# Patient Record
Sex: MALE | Race: ASIAN
[De-identification: names, ages, dates, MRNs, and addresses within clinical notes are randomized per-mention and may not be internally consistent; named-entity substitution may affect disease eponyms.]

---

## 2019-01-04 ENCOUNTER — HOSPITAL ENCOUNTER (EMERGENCY)
Dept: HOSPITAL 76 - ED | Age: 29
Discharge: HOME | End: 2019-01-04
Payer: COMMERCIAL

## 2019-01-04 VITALS — DIASTOLIC BLOOD PRESSURE: 80 MMHG | SYSTOLIC BLOOD PRESSURE: 120 MMHG

## 2019-01-04 DIAGNOSIS — B34.9: Primary | ICD-10-CM

## 2019-01-04 PROCEDURE — 87275 INFLUENZA B AG IF: CPT

## 2019-01-04 PROCEDURE — 87276 INFLUENZA A AG IF: CPT

## 2019-01-04 PROCEDURE — 99283 EMERGENCY DEPT VISIT LOW MDM: CPT

## 2019-01-04 NOTE — ED PHYSICIAN DOCUMENTATION
History of Present Illness





- Stated complaint


Stated Complaint: FEVER/HEADACHE





- Chief complaint


Chief Complaint: Neuro





- History obtained from


History obtained from: Patient





- History of Present Illness


Timing: Today





- Additonal information


Additional information: 





28-year-old male in his usual state of health awoke this morning with a headache

and developed fever and chills associated with this later in the day.  He 

subsequently has developed some nausea he has not developed a cough.  He does 

have generalized aches and pains as well.  The last time he felt like this it 

was the flu.  He has been around a number of families that have been ill with 

similar symptoms.





Review of Systems


Constitutional: reports: Fever, Chills, Myalgias, Fatigue


Eyes: denies: Decreased vision


Ears: denies: Ear pain


Nose: denies: Rhinorrhea / runny nose, Congestion


Throat: denies: Sore throat


Cardiac: denies: Chest pain / pressure, Palpitations


Respiratory: denies: Dyspnea, Cough


GI: reports: Nausea.  denies: Abdominal Pain, Vomiting, Constipation, Diarrhea


: denies: Dysuria, Frequency


Skin: denies: Rash


Musculoskeletal: denies: Neck pain, Back pain, Extremity pain


Neurologic: denies: Generalized weakness, Focal weakness, Numbness





PD PAST MEDICAL HISTORY





- Present Medications


Home Medications: 


                                Ambulatory Orders











 Medication  Instructions  Recorded  Confirmed


 


No Known Home Medications  01/04/19 01/04/19














- Allergies


Allergies/Adverse Reactions: 


                                    Allergies











Allergy/AdvReac Type Severity Reaction Status Date / Time


 


No Known Drug Allergies Allergy   Verified 01/04/19 20:52














PD ED PE NORMAL





- Vitals


Vital signs reviewed: Yes (febrile )





- HEENT


HEENT: Atraumatic, PERRL, EOMI, Ears normal, Moist mucous membranes, Pharynx 

benign, Dentition benign





- Neck


Neck: Supple, no meningeal sign, No bony TTP





- Cardiac


Cardiac: RRR, No murmur





- Respiratory


Respiratory: No respiratory distress, Clear bilaterally





- Abdomen


Abdomen: Soft, Non tender





- Back


Back: No CVA TTP, No spinal TTP





- Derm


Derm: Normal color, Warm and dry, No rash





- Extremities


Extremities: No deformity, No edema, No calf tenderness / cord





- Neuro


Neuro: Alert and oriented X 3, CNs 2-12 intact, No motor deficit, No sensory 

deficit, Normal speech


Eye Opening: Spontaneous


Motor: Obeys Commands


Verbal: Oriented


GCS Score: 15





- Psych


Psych: Normal mood, Normal affect





Results





- Vitals


Vitals: 


                               Vital Signs - 24 hr











  01/04/19 01/04/19





  20:49 21:38


 


Temperature 37.8 C H 


 


Heart Rate 88 86


 


Respiratory 16 16





Rate  


 


Blood Pressure 122/82 H 120/80


 


O2 Saturation 98 100








                                     Oxygen











O2 Source                      Room air

















- Labs


Labs: 


                                Laboratory Tests











  01/04/19





  21:10


 


Influenza A (Rapid)  Negative


 


Influenza B (Rapid)  Negative














PD MEDICAL DECISION MAKING





- ED course


Complexity details: reviewed results, re-evaluated patient, considered 

differential, d/w patient


ED course: 





28-year-old male with viral symptoms with headache and fever of a short duration

has negative influenza swab.  He is administered dexamethasone and given 

instructions on viral syndrome.





Departure





- Departure


Disposition: 01 Home, Self Care


Clinical Impression: 


 Acute viral syndrome





Condition: Stable


Instructions:  ED Viral Syndrome


Follow-Up: 


FEMI CHASE MD [Primary Care Provider] - 


Discharge Date/Time: 01/04/19 21:38

## 2019-06-08 ENCOUNTER — HOSPITAL ENCOUNTER (EMERGENCY)
Dept: HOSPITAL 76 - ED | Age: 29
Discharge: HOME | End: 2019-06-08
Payer: COMMERCIAL

## 2019-06-08 VITALS — SYSTOLIC BLOOD PRESSURE: 132 MMHG | DIASTOLIC BLOOD PRESSURE: 74 MMHG
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
## 2019-06-08 DIAGNOSIS — S61.451A: Primary | ICD-10-CM

## 2019-06-08 DIAGNOSIS — F17.200: ICD-10-CM

## 2019-06-08 PROCEDURE — 99283 EMERGENCY DEPT VISIT LOW MDM: CPT

## 2019-06-08 PROCEDURE — 12001 RPR S/N/AX/GEN/TRNK 2.5CM/<: CPT

## 2019-06-08 NOTE — ED PHYSICIAN DOCUMENTATION
PD HPI ANIMAL BITE





- Stated complaint


Stated Complaint: dog bite





- Chief complaint


Chief Complaint: Laceration





- History obtained from


History obtained from: Patient, Family





- History of Present Illness


Location of injury(ies): Right hand


Details of the event: Dog, Bite, Pet animal, Well appearing, Immunized, 

Provoked, Animal can be observed


Timing - onset: Today


Timing - duration: Minutes


Timing - details: Abrupt onset, Still present


Improved by: Rest, Immobilization


Worsened by: Moving, Palpating


Associated symptoms: No: Weakness, Numbness, Tingling, Swelling


Contributing factors: No: Immunocompromised


Similar symptoms before: Has not had sx before


Recently seen: Not recently seen





- Additional information


Additional information: 





28-year-old male was playing with his 1-year-old [place] puppy and he 

was playing tug of war when the dog bit the right hand and the patient states 

that he feels that he pulled his hand out of the dog's mouth tearing the tooth 

against the hand.  He has a laceration in the web space between the third and 

fourth fingers on the right hand





Review of Systems


Constitutional: denies: Fever


Respiratory: denies: Cough


GI: denies: Vomiting


Skin: denies: Rash


Musculoskeletal: reports: Extremity pain.  denies: Neck pain, Back pain, Joint 

swelling, Pain with weight bearing


Neurologic: reports: Numbness (surrounding the area of the bite but not 

distally).  denies: Generalized weakness, Focal weakness





PD PAST MEDICAL HISTORY





- Past Surgical History


Past Surgical History: No





- Present Medications


Home Medications: 


                                Ambulatory Orders











 Medication  Instructions  Recorded  Confirmed


 


Amox/Clav 875/125 [Augmentin] 1 each PO Q12H #10 tablet 06/08/19 














- Allergies


Allergies/Adverse Reactions: 


                                    Allergies











Allergy/AdvReac Type Severity Reaction Status Date / Time


 


No Known Drug Allergies Allergy   Verified 06/08/19 16:57














- Social History


Does the pt smoke?: Yes


Smoking Status: Current every day smoker


Does the pt drink ETOH?: Yes


Does the pt have substance abuse?: No





- Immunizations


Immunizations are current?: Yes





- POLST


Patient has POLST: No





PD ED PE NORMAL





- Vitals


Vital signs reviewed: Yes (normal )





- General


General: Alert and oriented X 3, No acute distress, Well developed/nourished





- HEENT


HEENT: Atraumatic, PERRL





- Respiratory


Respiratory: No respiratory distress





- Derm


Derm: Normal color, Warm and dry, No rash





- Extremities


Extremities: No deformity, No edema, Other (deep laceration between the 3rd and 

4th digits on the right hand  )





Results





- Vitals


Vitals: 


                               Vital Signs - 24 hr











  06/08/19





  16:55


 


Temperature 36.8 C


 


Heart Rate 72


 


Respiratory 20





Rate 


 


Blood Pressure 132/74 H


 


O2 Saturation 99








                                     Oxygen











O2 Source                      Room air

















Procedures





- Laceration (location)


  ** right hand 


Length in cm: 2


Wound type: Linear, Into subcut fat, Clean


Neurovascular status: Sensory intact, Motor intact, Vascular intact


Anesthesia: Lidocaine 1%, With bicarb


Wound Preparation: Hibiclens, Irrigated copiously NS, Wound explored, To the 

base


Skin layer closure: Nylon, Interrupted, Size #-0 - enter number (4-0), Sutures -

 enter # (3)


Other: Patient tolerated well, No complications, Neurovascular intact, Dressing 

applied, Tetanus UTD


Complexity: Simple





PD MEDICAL DECISION MAKING





- ED course


Complexity details: considered differential, d/w patient, d/w family


ED course: 





28-year-old male who was playing tug of war with his [place] has been 

bitten in the hand and has a laceration to the webspace between the third and 

fourth digits.  The wound is thoroughly cleaned out and loosely approximated 

with 3 sutures.  The patient is administered Augmentin 875 we will place him on 

a short course and he will have suture removal in 7 to 10 days.





Departure





- Departure


Disposition: 01 Home, Self Care


Clinical Impression: 


 Dog bite of right hand





Condition: Stable


Instructions:  ED Bite Dog, ED Laceration Hand


Follow-Up: 


FEMI CHASE MD [Primary Care Provider] - 


Prescriptions: 


Amox/Clav 875/125 [Augmentin] 1 each PO Q12H #10 tablet